# Patient Record
Sex: MALE | Race: WHITE | NOT HISPANIC OR LATINO | URBAN - METROPOLITAN AREA
[De-identification: names, ages, dates, MRNs, and addresses within clinical notes are randomized per-mention and may not be internally consistent; named-entity substitution may affect disease eponyms.]

---

## 2017-07-10 ENCOUNTER — FOLLOW UP ESTABLISHED (OUTPATIENT)
Dept: URBAN - METROPOLITAN AREA CLINIC 64 | Facility: CLINIC | Age: 62
End: 2017-07-10
Payer: COMMERCIAL

## 2017-07-10 PROCEDURE — 92015 DETERMINE REFRACTIVE STATE: CPT | Performed by: OPTOMETRIST

## 2017-07-10 PROCEDURE — 92014 COMPRE OPH EXAM EST PT 1/>: CPT | Performed by: OPTOMETRIST

## 2017-07-10 ASSESSMENT — INTRAOCULAR PRESSURE
OD: 12
OS: 13

## 2017-07-10 ASSESSMENT — VISUAL ACUITY
OS: 20/20
OD: 20/20

## 2017-07-10 ASSESSMENT — KERATOMETRY
OD: 45.59
OS: 45.45

## 2018-01-04 ENCOUNTER — FOLLOW UP ESTABLISHED (OUTPATIENT)
Dept: URBAN - METROPOLITAN AREA CLINIC 64 | Facility: CLINIC | Age: 63
End: 2018-01-04
Payer: COMMERCIAL

## 2018-01-04 PROCEDURE — 92015 DETERMINE REFRACTIVE STATE: CPT | Performed by: OPTOMETRIST

## 2018-01-04 PROCEDURE — 92014 COMPRE OPH EXAM EST PT 1/>: CPT | Performed by: OPTOMETRIST

## 2018-01-04 ASSESSMENT — VISUAL ACUITY
OS: 20/20
OD: 20/20

## 2018-01-04 ASSESSMENT — KERATOMETRY
OS: 45.45
OD: 45.49

## 2018-01-04 ASSESSMENT — INTRAOCULAR PRESSURE
OD: 8
OS: 13

## 2019-02-19 ENCOUNTER — FOLLOW UP ESTABLISHED (OUTPATIENT)
Dept: URBAN - METROPOLITAN AREA CLINIC 64 | Facility: CLINIC | Age: 64
End: 2019-02-19
Payer: COMMERCIAL

## 2019-02-19 PROCEDURE — 92015 DETERMINE REFRACTIVE STATE: CPT | Performed by: OPTOMETRIST

## 2019-02-19 PROCEDURE — 92014 COMPRE OPH EXAM EST PT 1/>: CPT | Performed by: OPTOMETRIST

## 2019-02-19 ASSESSMENT — VISUAL ACUITY
OD: 20/20
OS: 20/20

## 2019-02-19 ASSESSMENT — INTRAOCULAR PRESSURE
OS: 14
OD: 10

## 2019-02-19 ASSESSMENT — KERATOMETRY
OD: 45.52
OS: 45.52

## 2020-08-03 ENCOUNTER — FOLLOW UP ESTABLISHED (OUTPATIENT)
Dept: URBAN - METROPOLITAN AREA CLINIC 64 | Facility: CLINIC | Age: 65
End: 2020-08-03
Payer: COMMERCIAL

## 2020-08-03 DIAGNOSIS — H33.311 HORSESHOE TEAR OF RETINA WITHOUT DETACHMENT, RIGHT EYE: ICD-10-CM

## 2020-08-03 PROCEDURE — 92015 DETERMINE REFRACTIVE STATE: CPT | Performed by: OPTOMETRIST

## 2020-08-03 PROCEDURE — 92014 COMPRE OPH EXAM EST PT 1/>: CPT | Performed by: OPTOMETRIST

## 2020-08-03 ASSESSMENT — KERATOMETRY
OS: 45.55
OD: 45.67

## 2020-08-03 ASSESSMENT — VISUAL ACUITY
OD: 20/20
OS: 20/20

## 2020-08-03 ASSESSMENT — INTRAOCULAR PRESSURE
OS: 14
OD: 16

## 2021-04-12 ENCOUNTER — OFFICE VISIT (OUTPATIENT)
Dept: URBAN - METROPOLITAN AREA CLINIC 64 | Facility: CLINIC | Age: 66
End: 2021-04-12
Payer: COMMERCIAL

## 2021-04-12 DIAGNOSIS — H52.13 MYOPIA, BILATERAL: Primary | ICD-10-CM

## 2021-04-12 PROCEDURE — 92014 COMPRE OPH EXAM EST PT 1/>: CPT | Performed by: OPTOMETRIST

## 2021-04-12 PROCEDURE — 92310 CONTACT LENS FITTING OU: CPT | Performed by: OPTOMETRIST

## 2021-04-12 ASSESSMENT — VISUAL ACUITY
OD: 20/20
OS: 20/20

## 2021-04-12 ASSESSMENT — INTRAOCULAR PRESSURE
OS: 13
OD: 12

## 2021-04-12 ASSESSMENT — KERATOMETRY
OD: 45.58
OS: 45.73

## 2021-04-12 NOTE — IMPRESSION/PLAN
Impression: Myopia, bilateral: H52.13. Plan: Updated glasses Rx for PAL /  and SV computer. He may try and get SV computer online. Also interested in CLs. Wore sphericals in the past.  We'll try DVO sphericals again. May need torics. Discussed dry eye with CLs. Discussed [facility] limitations with CLs.

## 2022-04-21 ENCOUNTER — OFFICE VISIT (OUTPATIENT)
Dept: URBAN - METROPOLITAN AREA CLINIC 64 | Facility: CLINIC | Age: 67
End: 2022-04-21
Payer: MEDICARE

## 2022-04-21 DIAGNOSIS — H52.13 MYOPIA, BILATERAL: ICD-10-CM

## 2022-04-21 DIAGNOSIS — H33.311 HORSESHOE TEAR OF RETINA WITHOUT DETACHMENT, RIGHT EYE: ICD-10-CM

## 2022-04-21 DIAGNOSIS — H33.312 HORSESHOE TEAR OF RETINA WITHOUT DETACHMENT, LEFT EYE: Primary | ICD-10-CM

## 2022-04-21 PROCEDURE — 99214 OFFICE O/P EST MOD 30 MIN: CPT | Performed by: OPTOMETRIST

## 2022-04-21 ASSESSMENT — INTRAOCULAR PRESSURE
OD: 17
OS: 19

## 2022-04-21 ASSESSMENT — VISUAL ACUITY
OD: 20/20
OS: 20/20

## 2022-10-13 NOTE — IMPRESSION/PLAN
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 Plan: Recent. Laser repair. Monitor.
Impression: Horseshoe tear of retina without detachment, right eye, s/p repair from volleyball accident. Plan: Stable. Monitor.
Impression: Myopia, bilateral: H52.13. Plan: Discussed diagnosis in detail with patient. New glasses Rx was given today. Recommend yearly exams.
3